# Patient Record
Sex: MALE | Race: WHITE
[De-identification: names, ages, dates, MRNs, and addresses within clinical notes are randomized per-mention and may not be internally consistent; named-entity substitution may affect disease eponyms.]

---

## 2024-01-03 PROBLEM — Z00.00 ENCOUNTER FOR PREVENTIVE HEALTH EXAMINATION: Status: ACTIVE | Noted: 2024-01-03

## 2024-01-04 ENCOUNTER — APPOINTMENT (OUTPATIENT)
Dept: COLORECTAL SURGERY | Facility: CLINIC | Age: 49
End: 2024-01-04
Payer: COMMERCIAL

## 2024-01-04 VITALS
WEIGHT: 180 LBS | SYSTOLIC BLOOD PRESSURE: 134 MMHG | HEIGHT: 67 IN | RESPIRATION RATE: 16 BRPM | DIASTOLIC BLOOD PRESSURE: 91 MMHG | HEART RATE: 80 BPM | OXYGEN SATURATION: 98 % | BODY MASS INDEX: 28.25 KG/M2

## 2024-01-04 PROCEDURE — 46600 DIAGNOSTIC ANOSCOPY SPX: CPT

## 2024-01-04 PROCEDURE — 99204 OFFICE O/P NEW MOD 45 MIN: CPT | Mod: 25

## 2024-01-04 NOTE — PHYSICAL EXAM
[Normal rectal exam] : exam was normal [Manually Reducible] : a manually reducible (grade III) [Skin Tags] : residual hemorrhoidal skin tags were noted [Normal] : was normal [None] : there was no rectal mass  [Gross Blood] : no gross blood [No Rash or Lesion] : No rash or lesion [Alert] : alert [Oriented to Person] : oriented to person [Oriented to Place] : oriented to place [Oriented to Time] : oriented to time [Calm] : calm [de-identified] : Anterior grade 3, remainder are very engorged and full [de-identified] : No apparent distress [de-identified] : Normocephalic atraumatic [de-identified] : Moving all extremities x 4

## 2024-01-04 NOTE — CONSULT LETTER
[Dear  ___] : Dear  [unfilled], [Consult Letter:] : I had the pleasure of evaluating your patient, [unfilled]. [Please see my note below.] : Please see my note below. [Consult Closing:] : Thank you very much for allowing me to participate in the care of this patient.  If you have any questions, please do not hesitate to contact me. [Sincerely,] : Sincerely, [FreeTextEntry3] : Ashtyn Pack MD

## 2024-01-04 NOTE — HISTORY OF PRESENT ILLNESS
[FreeTextEntry1] : Mr. Peters presents to the office for consultation secondary to iron deficiency anemia. He had an EGD, capsule endoscopy and colonsocopy which was found to be negatve. The bleeding is now suspected to be from his internal hemorrhoids. He is here to discuss possible rubber band ligations to treat his internal hemorrhoids.

## 2024-01-04 NOTE — ASSESSMENT
[FreeTextEntry1] : Mr Peters presents to the office for consultation secondary to very engorged and prolapsing internal hemorrhoids that are likely the source of his bleeding as well as iron deficiency anemia.  We discussed the option of serial rubber band ligation including its temporizing effect on hemorrhoids/ rectal bleeding versus definitive surgical hemorrhoidectomy.  He would like to first proceed with serial rubber band ligations, and if this method fails, would then be more amenable to hemorrhoidectomy.  He is scheduled to go on a business visit soon, and would like to start rubber banding upon his return.  All questions and concerns addressed and he will follow-up in the near future for ligations.

## 2024-02-09 ENCOUNTER — APPOINTMENT (OUTPATIENT)
Dept: COLORECTAL SURGERY | Facility: CLINIC | Age: 49
End: 2024-02-09
Payer: COMMERCIAL

## 2024-02-09 VITALS
HEIGHT: 67 IN | SYSTOLIC BLOOD PRESSURE: 160 MMHG | HEART RATE: 90 BPM | WEIGHT: 180 LBS | DIASTOLIC BLOOD PRESSURE: 90 MMHG | RESPIRATION RATE: 16 BRPM | BODY MASS INDEX: 28.25 KG/M2

## 2024-02-09 DIAGNOSIS — F17.200 NICOTINE DEPENDENCE, UNSPECIFIED, UNCOMPLICATED: ICD-10-CM

## 2024-02-09 DIAGNOSIS — Z80.8 FAMILY HISTORY OF MALIGNANT NEOPLASM OF OTHER ORGANS OR SYSTEMS: ICD-10-CM

## 2024-02-09 DIAGNOSIS — Z86.2 PERSONAL HISTORY OF DISEASES OF THE BLOOD AND BLOOD-FORMING ORGANS AND CERTAIN DISORDERS INVOLVING THE IMMUNE MECHANISM: ICD-10-CM

## 2024-02-09 PROCEDURE — 99213 OFFICE O/P EST LOW 20 MIN: CPT | Mod: 25

## 2024-02-09 PROCEDURE — 46221 LIGATION OF HEMORRHOID(S): CPT

## 2024-02-09 RX ORDER — FERROUS SULFATE 137(45) MG
TABLET, EXTENDED RELEASE ORAL
Refills: 0 | Status: ACTIVE | COMMUNITY

## 2024-02-09 NOTE — HISTORY OF PRESENT ILLNESS
[FreeTextEntry1] : Mr. Peters presents to the office for consultation secondary to iron deficiency anemia. He had an EGD, capsule endoscopy and colonsocopy which was found to be negatve. The bleeding is now suspected to be from his internal hemorrhoids. He is here to discuss possible rubber band ligations to treat his internal hemorrhoids.   2/9/24 here for follow-up visit and is ready to undergo rubber band ligation.  He reports having started using Metamucil every morning and 2 days prior, began using MiraLAX every night.  Despite this, continues to bleed per rectum.

## 2024-02-09 NOTE — ASSESSMENT
[FreeTextEntry1] : Mr Peters presents to the office for consultation secondary to very engorged and prolapsing internal hemorrhoids that are likely the source of his bleeding as well as iron deficiency anemia.  We discussed the option of serial rubber band ligation including its temporizing effect on hemorrhoids/ rectal bleeding versus definitive surgical hemorrhoidectomy.  He would like to first proceed with serial rubber band ligations, and if this method fails, would then be more amenable to hemorrhoidectomy.  He is scheduled to go on a business visit soon, and would like to start rubber banding upon his return.  All questions and concerns addressed and he will follow-up in the near future for ligations.  2/9/24  Mr. Mckinney presented to the office with rectal bleeding from his internal hemorrhoids. I discussed the option of rubber band ligation, its temporizing effect on hemorrhoids, and cautioned that it is not a procedure with long lasting durable results. We proceeded to rubber band ligate his prolapsing posterior column to good effect. I have advised him on what to expect post procedure, and have recommended that he return to the office in 2 weeks' time for a repeat ligation. In the interim, he is to continue adhering to a high fiber diet with ample water intake.

## 2024-02-09 NOTE — PHYSICAL EXAM
[Normal rectal exam] : exam was normal [Manually Reducible] : a manually reducible (grade III) [Skin Tags] : residual hemorrhoidal skin tags were noted [Normal] : was normal [None] : there was no rectal mass  [Gross Blood] : no gross blood [No Rash or Lesion] : No rash or lesion [Alert] : alert [Oriented to Person] : oriented to person [Oriented to Place] : oriented to place [Oriented to Time] : oriented to time [Calm] : calm [de-identified] : posterior grade 3, remainder are very engorged and full [de-identified] : No apparent distress [de-identified] : Normocephalic atraumatic [de-identified] : Moving all extremities x 4

## 2024-03-06 ENCOUNTER — APPOINTMENT (OUTPATIENT)
Dept: COLORECTAL SURGERY | Facility: CLINIC | Age: 49
End: 2024-03-06
Payer: COMMERCIAL

## 2024-03-06 DIAGNOSIS — K64.8 OTHER HEMORRHOIDS: ICD-10-CM

## 2024-03-06 DIAGNOSIS — K62.5 HEMORRHAGE OF ANUS AND RECTUM: ICD-10-CM

## 2024-03-06 PROCEDURE — 46221 LIGATION OF HEMORRHOID(S): CPT

## 2024-03-06 NOTE — ASSESSMENT
[FreeTextEntry1] : Mr Peters presents to the office for consultation secondary to very engorged and prolapsing internal hemorrhoids that are likely the source of his bleeding as well as iron deficiency anemia.  We discussed the option of serial rubber band ligation including its temporizing effect on hemorrhoids/ rectal bleeding versus definitive surgical hemorrhoidectomy.  He would like to first proceed with serial rubber band ligations, and if this method fails, would then be more amenable to hemorrhoidectomy.  He is scheduled to go on a business visit soon, and would like to start rubber banding upon his return.  All questions and concerns addressed and he will follow-up in the near future for ligations.  2/9/24  Mr. Mckinney presented to the office with rectal bleeding from his internal hemorrhoids. I discussed the option of rubber band ligation, its temporizing effect on hemorrhoids, and cautioned that it is not a procedure with long lasting durable results. We proceeded to rubber band ligate his prolapsing posterior column to good effect. I have advised him on what to expect post procedure, and have recommended that he return to the office in 2 weeks' time for a repeat ligation. In the interim, he is to continue adhering to a high fiber diet with ample water intake.  3/6/24 Here for followup of his prolapsing and bleeding posterior internal hemorrhoidal bundle. In office today, we proceeded to RBL his posterior column again to good effect. He was advised that multiple ligations may be necessary for this column given its large size and prolapsing nature. He undersyands

## 2024-03-06 NOTE — PHYSICAL EXAM
[Normal rectal exam] : exam was normal [Manually Reducible] : a manually reducible (grade III) [Skin Tags] : residual hemorrhoidal skin tags were noted [Normal] : was normal [None] : there was no rectal mass  [Gross Blood] : no gross blood [No Rash or Lesion] : No rash or lesion [Alert] : alert [Oriented to Person] : oriented to person [Oriented to Place] : oriented to place [Oriented to Time] : oriented to time [Calm] : calm [de-identified] : posterior grade 3, remainder are very engorged and full [de-identified] : No apparent distress [de-identified] : Normocephalic atraumatic [de-identified] : Moving all extremities x 4

## 2024-03-06 NOTE — HISTORY OF PRESENT ILLNESS
[FreeTextEntry1] : Mr. Peters presents to the office for consultation secondary to iron deficiency anemia. He had an EGD, capsule endoscopy and colonsocopy which was found to be negatve. The bleeding is now suspected to be from his internal hemorrhoids. He is here to discuss possible rubber band ligations to treat his internal hemorrhoids.   2/9/24 here for follow-up visit and is ready to undergo rubber band ligation.  He reports having started using Metamucil every morning and 2 days prior, began using MiraLAX every night.  Despite this, continues to bleed per rectum.  3/6/24 Mr. Peters returns to the office for followup of bleeding and prolapsing internal hemorrhoids. SInce his last office visit with RBL, he reports mild to no improvement. He continues to note prolapsing posterior hemorrhoidal bundle. Here for additional ligations.

## 2024-04-12 ENCOUNTER — APPOINTMENT (OUTPATIENT)
Dept: COLORECTAL SURGERY | Facility: CLINIC | Age: 49
End: 2024-04-12
Payer: COMMERCIAL

## 2024-04-12 VITALS — HEIGHT: 67 IN | RESPIRATION RATE: 16 BRPM | BODY MASS INDEX: 26.84 KG/M2 | WEIGHT: 171 LBS

## 2024-04-12 PROCEDURE — 99214 OFFICE O/P EST MOD 30 MIN: CPT

## 2024-04-12 NOTE — HISTORY OF PRESENT ILLNESS
[FreeTextEntry1] : Mr. Peters presents to the office for consultation secondary to iron deficiency anemia. He had an EGD, capsule endoscopy and colonsocopy which was found to be negatve. The bleeding is now suspected to be from his internal hemorrhoids. He is here to discuss possible rubber band ligations to treat his internal hemorrhoids.   2/9/24 here for follow-up visit and is ready to undergo rubber band ligation.  He reports having started using Metamucil every morning and 2 days prior, began using MiraLAX every night.  Despite this, continues to bleed per rectum.  3/6/24 Mr. Peters returns to the office for followup of bleeding and prolapsing internal hemorrhoids. SInce his last office visit with RBL, he reports mild to no improvement. He continues to note prolapsing posterior hemorrhoidal bundle. Here for additional ligations.  4/12/24 Mr. Mckinney returns to the office for follow-up of his bleeding and prolapsing internal hemorrhoids.  Since his last office visit, he reports significant improvement.  He has also been compliant with his bowel regimen.  In the past, he reported rectal bleeding 3-4 times a week.  Now, he notes rectal bleeding may be less than once a week.  He is also no longer anemic and has stopped taking iron supplementation.

## 2024-04-12 NOTE — PHYSICAL EXAM
[Normal rectal exam] : exam was normal [Manually Reducible] : a manually reducible (grade III) [Skin Tags] : residual hemorrhoidal skin tags were noted [Normal] : was normal [None] : there was no rectal mass  [No Rash or Lesion] : No rash or lesion [Alert] : alert [Oriented to Person] : oriented to person [Oriented to Place] : oriented to place [Oriented to Time] : oriented to time [Calm] : calm [Gross Blood] : no gross blood [de-identified] : posterior grade 3, remainder are very engorged and full [de-identified] : No apparent distress [de-identified] : Normocephalic atraumatic [de-identified] : Moving all extremities x 4

## 2024-04-12 NOTE — ASSESSMENT
[FreeTextEntry1] : Mr Peters presents to the office for consultation secondary to very engorged and prolapsing internal hemorrhoids that are likely the source of his bleeding as well as iron deficiency anemia.  We discussed the option of serial rubber band ligation including its temporizing effect on hemorrhoids/ rectal bleeding versus definitive surgical hemorrhoidectomy.  He would like to first proceed with serial rubber band ligations, and if this method fails, would then be more amenable to hemorrhoidectomy.  He is scheduled to go on a business visit soon, and would like to start rubber banding upon his return.  All questions and concerns addressed and he will follow-up in the near future for ligations.  2/9/24  Mr. Mckinney presented to the office with rectal bleeding from his internal hemorrhoids. I discussed the option of rubber band ligation, its temporizing effect on hemorrhoids, and cautioned that it is not a procedure with long lasting durable results. We proceeded to rubber band ligate his prolapsing posterior column to good effect. I have advised him on what to expect post procedure, and have recommended that he return to the office in 2 weeks' time for a repeat ligation. In the interim, he is to continue adhering to a high fiber diet with ample water intake.  3/6/24 Here for followup of his prolapsing and bleeding posterior internal hemorrhoidal bundle. In office today, we proceeded to RBL his posterior column again to good effect. He was advised that multiple ligations may be necessary for this column given its large size and prolapsing nature. He undersyands   4/12/24 Mr. Peters returns to the office for follow-up of his bleeding and prolapsing internal hemorrhoids.  Overall, he has noted significant improvement in bleeding, prolapse and overall hemorrhoidal discomfort.  In addition, he is no longer anemic and has stopped iron supplementation.  He has been compliant with his bowel regimen.  As he is feeling fairly well, he declined anoscopy and rubber band ligation at today's office visit.  As he is overall feeling well and otherwise asymptomatic, I agree that we could defer anal rectal exam and he can plan to follow-up when rectal bleeding recurs or when he notes that the hemorrhoidal prolapse has worsened.  Follow-up as needed.

## 2024-07-03 ENCOUNTER — APPOINTMENT (OUTPATIENT)
Dept: COLORECTAL SURGERY | Facility: CLINIC | Age: 49
End: 2024-07-03
Payer: COMMERCIAL

## 2024-07-03 VITALS
BODY MASS INDEX: 25.37 KG/M2 | WEIGHT: 162 LBS | SYSTOLIC BLOOD PRESSURE: 136 MMHG | HEART RATE: 73 BPM | DIASTOLIC BLOOD PRESSURE: 92 MMHG

## 2024-07-03 DIAGNOSIS — K64.8 OTHER HEMORRHOIDS: ICD-10-CM

## 2024-07-03 DIAGNOSIS — K62.5 HEMORRHAGE OF ANUS AND RECTUM: ICD-10-CM

## 2024-07-03 PROCEDURE — 46221 LIGATION OF HEMORRHOID(S): CPT
